# Patient Record
Sex: MALE | Race: WHITE | NOT HISPANIC OR LATINO | Employment: UNEMPLOYED | URBAN - METROPOLITAN AREA
[De-identification: names, ages, dates, MRNs, and addresses within clinical notes are randomized per-mention and may not be internally consistent; named-entity substitution may affect disease eponyms.]

---
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
## 2023-03-24 ENCOUNTER — HOSPITAL ENCOUNTER (OUTPATIENT)
Dept: GENERAL RADIOLOGY | Facility: HOSPITAL | Age: 18
Discharge: HOME | End: 2023-03-24
Payer: COMMERCIAL

## 2023-03-24 ENCOUNTER — TRANSCRIBE ORDERS (OUTPATIENT)
Dept: ADMINISTRATIVE | Facility: HOSPITAL | Age: 18
End: 2023-03-24
Payer: COMMERCIAL

## 2023-03-24 DIAGNOSIS — M54.9 BACK PAIN DUE TO INJURY: ICD-10-CM

## 2023-03-24 DIAGNOSIS — M54.9 BACK PAIN DUE TO INJURY: Primary | ICD-10-CM

## 2023-03-24 PROCEDURE — 72072 X-RAY EXAM THORAC SPINE 3VWS: CPT

## 2023-03-24 PROCEDURE — 72100 X-RAY EXAM L-S SPINE 2/3 VWS: CPT

## 2023-03-27 ENCOUNTER — TRANSCRIBE ORDERS (OUTPATIENT)
Dept: ADMINISTRATIVE | Facility: HOSPITAL | Age: 18
End: 2023-03-27
Payer: COMMERCIAL

## 2023-03-27 DIAGNOSIS — Z87.39 HISTORY OF BACK PAIN: Primary | ICD-10-CM

## 2024-03-19 ENCOUNTER — TELEPHONE (OUTPATIENT)
Dept: ORTHOPEDIC SURGERY | Facility: CLINIC | Age: 19
End: 2024-03-19
Payer: COMMERCIAL

## 2024-03-19 NOTE — TELEPHONE ENCOUNTER
BENNY READY TO SCHEDULE -NEW PT - Metatarsal stress fracture, right - PROG NOTE UC 3.18.24 -XR 3.18.24 - NO KNOWN SX     URGENT REFERRAL CAN DR. LANGFORD SEE SOON?

## 2024-03-25 ENCOUNTER — OFFICE VISIT (OUTPATIENT)
Dept: ORTHOPEDIC SURGERY | Facility: CLINIC | Age: 19
End: 2024-03-25
Payer: COMMERCIAL

## 2024-03-25 VITALS — WEIGHT: 186 LBS | BODY MASS INDEX: 30.99 KG/M2 | HEIGHT: 65 IN | TEMPERATURE: 98.6 F

## 2024-03-25 DIAGNOSIS — M84.374A STRESS FRACTURE OF METATARSAL BONE OF RIGHT FOOT, INITIAL ENCOUNTER: Primary | ICD-10-CM

## 2024-03-25 PROCEDURE — 99203 OFFICE O/P NEW LOW 30 MIN: CPT | Performed by: STUDENT IN AN ORGANIZED HEALTH CARE EDUCATION/TRAINING PROGRAM

## 2024-03-25 RX ORDER — VALACYCLOVIR HYDROCHLORIDE 500 MG/1
TABLET, FILM COATED ORAL
COMMUNITY
Start: 2024-01-12

## 2024-03-25 RX ORDER — ACYCLOVIR 400 MG/1
TABLET ORAL
COMMUNITY
Start: 2024-01-24

## 2024-03-25 NOTE — LETTER
March 25, 2024     Patient: Dank Lucas   YOB: 2005   Date of Visit: 3/25/2024       To Whom it May Concern:    Dank Lucas was seen in my clinic on 3/25/2024. He should not be running, rucking or lower extremity weight lifting until 4/22/24.    If you have any questions or concerns, please don't hesitate to call.         Sincerely,          Chia Wren PA-C

## 2024-03-25 NOTE — PROGRESS NOTES
Office Note     Name: Dank Lucas    : 2005     MRN: [id number]     Chief Complaint  Pain of the Right Foot (States he was doing a 14 mile walk/run with a 50 lb bag on 3/7/24, that night he started having pain. The pain is mostly on the side of his foot, it is worse when putting pressure on his heel.)    Subjective     History of Present Illness:  Dank Lucas is a 18 y.o. male presenting today for evaluation of acute right lateral foot pain ongoing for approximately 2-1/2 weeks.  Mechanism described above.  Patient states the pain is in the lateral aspect of the midfoot and is only exacerbated by running or rucking.  Patient denies any pain at rest.  He denies any pain in the ankle or toes.  Denies any previous injuries to the affected area.  X-rays from an outside facility reveal no acute osseous abnormalities.  Denies paresthesias.  Patient is a student and is in [employer].    Review of Systems   Constitutional:  Negative for fever.   HENT:  Negative for dental problem and voice change.    Eyes:  Negative for visual disturbance.   Respiratory:  Negative for shortness of breath.    Cardiovascular:  Negative for chest pain.   Gastrointestinal:  Negative for abdominal pain.   Genitourinary:  Negative for dysuria.   Musculoskeletal:  Positive for arthralgias (right foot). Negative for gait problem and joint swelling.   Skin:  Negative for rash.   Neurological:  Negative for speech difficulty.   Hematological:  Does not bruise/bleed easily.   Psychiatric/Behavioral:  Negative for confusion.         No past medical history on file.     No past surgical history on file.    Family History   Problem Relation Age of Onset    Heart disease Maternal Grandfather        Social History     Socioeconomic History    Marital status: Single   Tobacco Use    Smoking status: Never    Smokeless tobacco: Never   Vaping Use    Vaping status: Never Used   Substance and Sexual Activity    Alcohol use: Never    Drug use: Never     "Sexual activity: Defer         Current Outpatient Medications:     acyclovir (ZOVIRAX) 400 MG tablet, 1 tablet Orally three times daily for 5 days, Disp: , Rfl:     valACYclovir (VALTREX) 500 MG tablet, , Disp: , Rfl:     Allergies   Allergen Reactions    Latex Hives    Wound Dressing Adhesive Hives           Objective   Temp 98.6 °F (37 °C)   Ht 165.1 cm (65\")   Wt 84.4 kg (186 lb)   BMI 30.95 kg/m²    BMI is >= 30 and <35. (Class 1 Obesity). The following options were offered after discussion;: weight loss educational material (shared in after visit summary)       Physical Exam  Right Ankle Exam   Right ankle exam is normal.     Comments:  There is no swelling bruising erythema.  There is very mild tenderness to deep palpation at the base of the fifth metatarsal.  There is no crepitus in the foot.  Full range of motion in the ankle and all toes without increase in pain.  Pedal pulses 2+, cap refill brisk to each digit, gross sensation intact to light touch.           Extremity DVT signs are negative by clinical screen.     Independent Review of Radiographic Studies:    Reviewed images and agree with radiologist interpretation.      Study Result    Narrative & Impression   FINAL REPORT     CLINICAL HISTORY:  right 5th metatarsal pain after lots of walking     COMPARISON:  None     FINDINGS:  RIGHT FOOT:  Three views of the right foot were obtained. There  is no acute fracture or dislocation. The joint spaces are  intact. There is no soft tissue abnormality.     IMPRESSION:  No acute bony abnormality.     Authenticated and Electronically Signed by Clarence Chan III, MD on 03/18/2024 03:53:51 PM       Procedures    Assessment and Plan   Diagnoses and all orders for this visit:    1. Stress fracture of metatarsal bone of right foot, initial encounter (Primary)       Discussion of orthopedic goals  Orthopedic activities reviewed and patient expressed appreciation  Regular exercise as tolerated  Risk, benefits, " and merits of treatment alternatives reviewed with the patient and questions answered  Patient guided on mobility and conditioning exercises  Ice, heat, and/or modalities as beneficial  Elevate foot for residual swelling  Reduced physical activity as appropriate and avoid offending activity  Weight bearing parameters reviewed  Counseling on diet, nutrition, fitness exercise, and weight reduction goals    Recommendations/Plan:  Exercise, medications, injections, other patient advice, and return appointment as noted.  Patient is encouraged to call or return for any issues or concerns.    Differential diagnosis:  Fifth metatarsal stress fracture occult, right foot sprain, plantar fasciitis    Review of the x-rays revealed no acute osseous abnormality though patient history is consistent with stress fracture and/or foot sprain.  Advised conservative treatment at this time mostly consisting of rest and avoiding aggravating activities.  Patient was given a school note noting that he should not be expected to run, Ruck, or do any lower extremity weightlifting for at least 4 weeks.  Advised follow-up with me in 6 weeks if symptoms persist consider CT and/or MRI of the right foot for further evaluation.  Advised patient to call or return office for any concerns in the interim.    Return in about 6 weeks (around 5/6/2024), or if symptoms worsen or fail to improve, for Recheck.  Patient agreeable to call or return sooner for any concerns.

## 2024-04-02 ENCOUNTER — PATIENT ROUNDING (BHMG ONLY) (OUTPATIENT)
Dept: ORTHOPEDIC SURGERY | Facility: CLINIC | Age: 19
End: 2024-04-02
Payer: COMMERCIAL

## 2024-04-02 NOTE — PROGRESS NOTES
"April 2, 2024    Hello, may I speak with Dank Lucas?    My name is Reba Crawford    I am  with [facility]  [address] [place] [id number] [phone].    Before we get started may I verify your date of birth? 2005    I am calling to officially welcome you to our practice and ask about your recent visit. Is this a good time to talk? Yes    Tell me about your visit with us. What things went well?  \"Got in quickly and was in and out in no time.\"       We're always looking for ways to make our patients' experiences even better. Do you have recommendations on ways we may improve?  No    Overall were you satisfied with your first visit to our practice? Yes       I appreciate you taking the time to speak with me today. Is there anything else I can do for you? Yes, reviewed the plan and when to follow up.      Thank you, and have a great day.      "